# Patient Record
Sex: FEMALE | Race: WHITE | URBAN - METROPOLITAN AREA
[De-identification: names, ages, dates, MRNs, and addresses within clinical notes are randomized per-mention and may not be internally consistent; named-entity substitution may affect disease eponyms.]

---

## 2020-01-29 ENCOUNTER — HOSPITAL ENCOUNTER (OUTPATIENT)
Age: 40
Setting detail: SPECIMEN
Discharge: HOME | End: 2020-01-29
Payer: COMMERCIAL

## 2020-02-08 LAB — SURGICAL PATHOLOGY REPORT: NORMAL

## 2023-05-10 ENCOUNTER — HOSPITAL ENCOUNTER (OUTPATIENT)
Age: 43
Setting detail: SPECIMEN
Discharge: HOME | End: 2023-05-10

## 2023-05-12 LAB
MICROORGANISM SPEC CULT: ABNORMAL
MICROORGANISM SPEC CULT: ABNORMAL
SPECIMEN DESCRIPTION: ABNORMAL

## 2023-07-26 RX ORDER — PANTOPRAZOLE SODIUM 20 MG/1
TABLET, DELAYED RELEASE ORAL
COMMUNITY
Start: 2023-05-17 | End: 2023-07-27 | Stop reason: SDUPTHER

## 2023-07-26 RX ORDER — FLUTICASONE FUROATE AND VILANTEROL TRIFENATATE 200; 25 UG/1; UG/1
POWDER RESPIRATORY (INHALATION)
COMMUNITY
Start: 2023-05-17 | End: 2023-07-27 | Stop reason: SDUPTHER

## 2023-07-27 RX ORDER — PANTOPRAZOLE SODIUM 20 MG/1
TABLET, DELAYED RELEASE ORAL
Qty: 90 TABLET | Refills: 1 | Status: SHIPPED | OUTPATIENT
Start: 2023-07-27

## 2023-07-27 RX ORDER — CETIRIZINE HYDROCHLORIDE 10 MG/1
10 TABLET ORAL DAILY
Qty: 90 TABLET | Refills: 1 | Status: SHIPPED | OUTPATIENT
Start: 2023-07-27

## 2023-07-27 RX ORDER — ALBUTEROL SULFATE 90 UG/1
2 AEROSOL, METERED RESPIRATORY (INHALATION) EVERY 6 HOURS PRN
Qty: 18 G | Refills: 3 | Status: SHIPPED | OUTPATIENT
Start: 2023-07-27

## 2023-07-27 RX ORDER — FLUTICASONE FUROATE AND VILANTEROL TRIFENATATE 200; 25 UG/1; UG/1
POWDER RESPIRATORY (INHALATION)
Qty: 1 EACH | Refills: 3 | Status: SHIPPED | OUTPATIENT
Start: 2023-07-27

## 2023-07-27 RX ORDER — FLUTICASONE PROPIONATE 50 MCG
2 SPRAY, SUSPENSION (ML) NASAL DAILY
Qty: 48 G | Refills: 1 | Status: SHIPPED | OUTPATIENT
Start: 2023-07-27

## 2023-08-07 ENCOUNTER — HOSPITAL ENCOUNTER (OUTPATIENT)
Age: 43
Discharge: HOME | End: 2023-08-07
Payer: COMMERCIAL

## 2023-08-07 ENCOUNTER — OFFICE VISIT (OUTPATIENT)
Age: 43
End: 2023-08-07
Payer: COMMERCIAL

## 2023-08-07 ENCOUNTER — HOSPITAL ENCOUNTER (OUTPATIENT)
Dept: CT IMAGING | Age: 43
Discharge: HOME | End: 2023-08-09
Payer: COMMERCIAL

## 2023-08-07 VITALS
WEIGHT: 148.5 LBS | HEIGHT: 60 IN | DIASTOLIC BLOOD PRESSURE: 80 MMHG | TEMPERATURE: 98.5 F | RESPIRATION RATE: 18 BRPM | SYSTOLIC BLOOD PRESSURE: 128 MMHG | HEART RATE: 66 BPM | BODY MASS INDEX: 29.16 KG/M2

## 2023-08-07 DIAGNOSIS — R10.12 LEFT UPPER QUADRANT ABDOMINAL PAIN: ICD-10-CM

## 2023-08-07 DIAGNOSIS — R11.0 NAUSEA: ICD-10-CM

## 2023-08-07 DIAGNOSIS — R10.12 LEFT UPPER QUADRANT ABDOMINAL PAIN: Primary | ICD-10-CM

## 2023-08-07 LAB
ALBUMIN SERPL-MCNC: 4.3 G/DL (ref 3.5–5.2)
ALBUMIN/GLOB SERPL: 1.2 {RATIO} (ref 1–2.5)
ALP SERPL-CCNC: 54 U/L (ref 35–104)
ALT SERPL-CCNC: 14 U/L (ref 5–33)
ANION GAP SERPL CALCULATED.3IONS-SCNC: 13 MMOL/L (ref 9–17)
AST SERPL-CCNC: 20 U/L
BASOPHILS # BLD: 0.06 K/UL (ref 0–0.2)
BASOPHILS NFR BLD: 1 % (ref 0–2)
BILIRUB SERPL-MCNC: 0.7 MG/DL (ref 0.3–1.2)
BILIRUBIN, POC: NEGATIVE
BLOOD URINE, POC: NEGATIVE
BUN SERPL-MCNC: 12 MG/DL (ref 6–20)
CALCIUM SERPL-MCNC: 9.4 MG/DL (ref 8.6–10.4)
CHLORIDE SERPL-SCNC: 99 MMOL/L (ref 98–107)
CLARITY, POC: CLEAR
CO2 SERPL-SCNC: 25 MMOL/L (ref 20–31)
COLOR, POC: YELLOW
CONTROL: PRESENT
CREAT SERPL-MCNC: 0.7 MG/DL (ref 0.5–0.9)
EOSINOPHIL # BLD: 0.48 K/UL (ref 0–0.44)
EOSINOPHILS RELATIVE PERCENT: 5 % (ref 1–4)
ERYTHROCYTE [DISTWIDTH] IN BLOOD BY AUTOMATED COUNT: 12.1 % (ref 11.8–14.4)
GFR SERPL CREATININE-BSD FRML MDRD: >60 ML/MIN/1.73M2
GLUCOSE SERPL-MCNC: 119 MG/DL (ref 70–99)
GLUCOSE URINE, POC: NEGATIVE
HCT VFR BLD AUTO: 42.2 % (ref 36.3–47.1)
HGB BLD-MCNC: 14.6 G/DL (ref 11.9–15.1)
IMM GRANULOCYTES # BLD AUTO: <0.03 K/UL (ref 0–0.3)
IMM GRANULOCYTES NFR BLD: 0 %
KETONES, POC: NEGATIVE
LEUKOCYTE EST, POC: NEGATIVE
LYMPHOCYTES NFR BLD: 2.8 K/UL (ref 1.1–3.7)
LYMPHOCYTES RELATIVE PERCENT: 29 % (ref 24–43)
MCH RBC QN AUTO: 33 PG (ref 25.2–33.5)
MCHC RBC AUTO-ENTMCNC: 34.6 G/DL (ref 28.4–34.8)
MCV RBC AUTO: 95.3 FL (ref 82.6–102.9)
MONOCYTES NFR BLD: 0.65 K/UL (ref 0.1–1.2)
MONOCYTES NFR BLD: 7 % (ref 3–12)
NEUTROPHILS NFR BLD: 58 % (ref 36–65)
NEUTS SEG NFR BLD: 5.69 K/UL (ref 1.5–8.1)
NITRITE, POC: NEGATIVE
NRBC BLD-RTO: 0 PER 100 WBC
PH, POC: 5.5
PLATELET # BLD AUTO: 268 K/UL (ref 138–453)
PMV BLD AUTO: 9.5 FL (ref 8.1–13.5)
POTASSIUM SERPL-SCNC: 3.6 MMOL/L (ref 3.7–5.3)
PREGNANCY TEST URINE, POC: NEGATIVE
PROT SERPL-MCNC: 7.9 G/DL (ref 6.4–8.3)
PROTEIN, POC: NEGATIVE
RBC # BLD AUTO: 4.43 M/UL (ref 3.95–5.11)
SODIUM SERPL-SCNC: 137 MMOL/L (ref 135–144)
SPECIFIC GRAVITY, POC: 1.02
UROBILINOGEN, POC: 0.2
WBC OTHER # BLD: 9.7 K/UL (ref 3.5–11.3)

## 2023-08-07 PROCEDURE — 81025 URINE PREGNANCY TEST: CPT

## 2023-08-07 PROCEDURE — 2580000003 HC RX 258

## 2023-08-07 PROCEDURE — 85025 COMPLETE CBC W/AUTO DIFF WBC: CPT

## 2023-08-07 PROCEDURE — 81025 URINE PREGNANCY TEST: CPT | Performed by: FAMILY MEDICINE

## 2023-08-07 PROCEDURE — 80053 COMPREHEN METABOLIC PANEL: CPT

## 2023-08-07 PROCEDURE — 36415 COLL VENOUS BLD VENIPUNCTURE: CPT

## 2023-08-07 PROCEDURE — 99214 OFFICE O/P EST MOD 30 MIN: CPT | Performed by: FAMILY MEDICINE

## 2023-08-07 PROCEDURE — 81003 URINALYSIS AUTO W/O SCOPE: CPT | Performed by: FAMILY MEDICINE

## 2023-08-07 PROCEDURE — 74160 CT ABDOMEN W/CONTRAST: CPT

## 2023-08-07 PROCEDURE — 81002 URINALYSIS NONAUTO W/O SCOPE: CPT

## 2023-08-07 PROCEDURE — 6360000004 HC RX CONTRAST MEDICATION

## 2023-08-07 RX ORDER — 0.9 % SODIUM CHLORIDE 0.9 %
80 INTRAVENOUS SOLUTION INTRAVENOUS ONCE
Status: COMPLETED | OUTPATIENT
Start: 2023-08-07 | End: 2023-08-07

## 2023-08-07 RX ORDER — SODIUM CHLORIDE 0.9 % (FLUSH) 0.9 %
10 SYRINGE (ML) INJECTION PRN
Status: DISCONTINUED | OUTPATIENT
Start: 2023-08-07 | End: 2023-08-10 | Stop reason: HOSPADM

## 2023-08-07 RX ORDER — HYDROCHLOROTHIAZIDE 25 MG/1
TABLET ORAL
COMMUNITY
Start: 2023-05-17

## 2023-08-07 RX ORDER — CYCLOBENZAPRINE HCL 10 MG
TABLET ORAL
COMMUNITY
Start: 2023-05-17

## 2023-08-07 RX ADMIN — IOPAMIDOL 75 ML: 755 INJECTION, SOLUTION INTRAVENOUS at 18:00

## 2023-08-07 RX ADMIN — SODIUM CHLORIDE, PRESERVATIVE FREE 10 ML: 5 INJECTION INTRAVENOUS at 18:01

## 2023-08-07 RX ADMIN — SODIUM CHLORIDE 80 ML: 9 INJECTION, SOLUTION INTRAVENOUS at 18:01

## 2023-08-07 SDOH — ECONOMIC STABILITY: INCOME INSECURITY: HOW HARD IS IT FOR YOU TO PAY FOR THE VERY BASICS LIKE FOOD, HOUSING, MEDICAL CARE, AND HEATING?: VERY HARD

## 2023-08-07 SDOH — ECONOMIC STABILITY: HOUSING INSECURITY
IN THE LAST 12 MONTHS, WAS THERE A TIME WHEN YOU DID NOT HAVE A STEADY PLACE TO SLEEP OR SLEPT IN A SHELTER (INCLUDING NOW)?: NO

## 2023-08-07 SDOH — ECONOMIC STABILITY: FOOD INSECURITY: WITHIN THE PAST 12 MONTHS, YOU WORRIED THAT YOUR FOOD WOULD RUN OUT BEFORE YOU GOT MONEY TO BUY MORE.: NEVER TRUE

## 2023-08-07 SDOH — ECONOMIC STABILITY: FOOD INSECURITY: WITHIN THE PAST 12 MONTHS, THE FOOD YOU BOUGHT JUST DIDN'T LAST AND YOU DIDN'T HAVE MONEY TO GET MORE.: NEVER TRUE

## 2023-08-07 ASSESSMENT — PATIENT HEALTH QUESTIONNAIRE - PHQ9
SUM OF ALL RESPONSES TO PHQ QUESTIONS 1-9: 10
SUM OF ALL RESPONSES TO PHQ QUESTIONS 1-9: 10
10. IF YOU CHECKED OFF ANY PROBLEMS, HOW DIFFICULT HAVE THESE PROBLEMS MADE IT FOR YOU TO DO YOUR WORK, TAKE CARE OF THINGS AT HOME, OR GET ALONG WITH OTHER PEOPLE: 2
SUM OF ALL RESPONSES TO PHQ9 QUESTIONS 1 & 2: 4
1. LITTLE INTEREST OR PLEASURE IN DOING THINGS: 1
5. POOR APPETITE OR OVEREATING: 1
4. FEELING TIRED OR HAVING LITTLE ENERGY: 1
9. THOUGHTS THAT YOU WOULD BE BETTER OFF DEAD, OR OF HURTING YOURSELF: 0
8. MOVING OR SPEAKING SO SLOWLY THAT OTHER PEOPLE COULD HAVE NOTICED. OR THE OPPOSITE, BEING SO FIGETY OR RESTLESS THAT YOU HAVE BEEN MOVING AROUND A LOT MORE THAN USUAL: 1
6. FEELING BAD ABOUT YOURSELF - OR THAT YOU ARE A FAILURE OR HAVE LET YOURSELF OR YOUR FAMILY DOWN: 1
SUM OF ALL RESPONSES TO PHQ QUESTIONS 1-9: 10
7. TROUBLE CONCENTRATING ON THINGS, SUCH AS READING THE NEWSPAPER OR WATCHING TELEVISION: 1
3. TROUBLE FALLING OR STAYING ASLEEP: 1
2. FEELING DOWN, DEPRESSED OR HOPELESS: 3
SUM OF ALL RESPONSES TO PHQ QUESTIONS 1-9: 10

## 2023-08-07 ASSESSMENT — ENCOUNTER SYMPTOMS
SHORTNESS OF BREATH: 0
CONSTIPATION: 0
COLOR CHANGE: 0
CHEST TIGHTNESS: 0
VOMITING: 0
NAUSEA: 1
DIARRHEA: 0

## 2023-08-07 ASSESSMENT — LIFESTYLE VARIABLES
HOW OFTEN DO YOU HAVE A DRINK CONTAINING ALCOHOL: 2-4 TIMES A MONTH
HOW MANY STANDARD DRINKS CONTAINING ALCOHOL DO YOU HAVE ON A TYPICAL DAY: 1 OR 2

## 2023-08-07 NOTE — PATIENT INSTRUCTIONS
Thank you for following up with us at [facility] outpatient residency clinic. It was a pleasure to see you today. Our plan is the following:  -An order for a CT of the abdomen has been placed. I will call with the results of the imaging.   -An order for labwork has also been placed. Thank you for following up with us at [facility] outpatient residency clinic. It was a pleasure to see you today. If you have any additional questions or concerns, please call the office ([phone]) and speak to one of the staff. They will triage and forward the message to the doctors. Have a great rest of your day.

## 2023-08-07 NOTE — PROGRESS NOTES
[address] [facility]  [address] [facility], [address] [place] [address]  Phone: [phone]  Fax: [phone]      Date of Visit:  2023  Patient Name: Haylie Murray   Patient :  1980     HPI:     Haylie Murray is a 38 y.o. female who presents today to discuss left upper quadrant pain. Chief Complaint   Patient presents with    Flank Pain     Right side rib pain for the past few weeks. Hurts to breathe deeply or lay on it. NO acute injury. No fever. OTC tylenol and Gas-X     Patient reports right-sided rib pain that she has been experiencing over the past few weeks. The discomfort is constant. She feels that it has been getting worse over past few days. She reports some nausea and dizziness. No vomiting. No diahrea or constipation. No blood in urine or stool. Reports history of uterine fiboids and possible endometriosis. Sexually active with no contraceptive. LMP 23. Pain started prior to when she had her last period. I personally reviewed the patient's past medical history, current medications, allergies, surgical history, family history and social history. Updates were made as necessary. REVIEW OF SYSTEM      Review of Systems   Constitutional:  Negative for appetite change, chills and fever. Respiratory:  Negative for chest tightness and shortness of breath. Pain with deep inspiration   Cardiovascular:  Negative for chest pain. Gastrointestinal:  Positive for nausea. Negative for constipation, diarrhea and vomiting. Genitourinary:  Negative for flank pain and hematuria. Skin:  Negative for color change and rash. Neurological:  Negative for dizziness and light-headedness. REVIEWED INFORMATION      No Known Allergies    There is no problem list on file for this patient. Past Medical History:   Diagnosis Date    Asthma     GERD (gastroesophageal reflux disease)        No past surgical history on file.

## 2023-08-08 ENCOUNTER — TELEPHONE (OUTPATIENT)
Age: 43
End: 2023-08-08

## 2023-08-08 RX ORDER — MELOXICAM 7.5 MG/1
7.5 TABLET ORAL DAILY
Qty: 5 TABLET | Refills: 0 | Status: SHIPPED | OUTPATIENT
Start: 2023-08-08 | End: 2023-08-13

## 2023-08-08 NOTE — TELEPHONE ENCOUNTER
Called and discussed results with patient. CT abdomen negative for acute pathology. CBC and CMP within normal limits. Discussed that the most likely etiology is a rectus abdominis strain. Recommended rest from exacerbating motions, applying ice to the affected area for up to 20 minutes at a time. Prescription for meloxicam 7.5 mg once daily for 5 days was sent to patient's pharmacy. Recommended that patient take the medication with food. A prescription for Voltaren gel was also sent to the patient's pharmacy that she may apply to the affected area up to 4 times a day. Discussed that if the patient develops fevers, chills, difficulty breathing, chest pain or worsening abdominal pain that she should be seen either at the clinic or the ER. Patient voiced understanding and is in agreement with current plan.

## 2023-08-08 NOTE — TELEPHONE ENCOUNTER
Patient calling for results of CT abd done yesterday. States she is still in pain and asking what steps are next.

## 2023-09-06 RX ORDER — HYDROCHLOROTHIAZIDE 25 MG/1
TABLET ORAL
Qty: 30 TABLET | Refills: 0 | Status: SHIPPED | OUTPATIENT
Start: 2023-09-06

## 2023-09-08 ENCOUNTER — OFFICE VISIT (OUTPATIENT)
Age: 43
End: 2023-09-08
Payer: COMMERCIAL

## 2023-09-08 ENCOUNTER — HOSPITAL ENCOUNTER (OUTPATIENT)
Age: 43
Setting detail: SPECIMEN
Discharge: HOME | End: 2023-09-08

## 2023-09-08 VITALS
TEMPERATURE: 98.2 F | DIASTOLIC BLOOD PRESSURE: 90 MMHG | SYSTOLIC BLOOD PRESSURE: 143 MMHG | WEIGHT: 150 LBS | BODY MASS INDEX: 29.45 KG/M2 | HEIGHT: 60 IN | HEART RATE: 62 BPM

## 2023-09-08 DIAGNOSIS — R07.81 RIB PAIN ON LEFT SIDE: ICD-10-CM

## 2023-09-08 DIAGNOSIS — M79.89 PAIN AND SWELLING OF LOWER LEG, UNSPECIFIED LATERALITY: Primary | ICD-10-CM

## 2023-09-08 DIAGNOSIS — M79.669 PAIN AND SWELLING OF LOWER LEG, UNSPECIFIED LATERALITY: ICD-10-CM

## 2023-09-08 DIAGNOSIS — I73.00 RAYNAUD'S PHENOMENON WITHOUT GANGRENE: ICD-10-CM

## 2023-09-08 DIAGNOSIS — R03.0 ELEVATED BLOOD PRESSURE READING: ICD-10-CM

## 2023-09-08 DIAGNOSIS — M79.89 PAIN AND SWELLING OF LOWER LEG, UNSPECIFIED LATERALITY: ICD-10-CM

## 2023-09-08 DIAGNOSIS — M79.669 PAIN AND SWELLING OF LOWER LEG, UNSPECIFIED LATERALITY: Primary | ICD-10-CM

## 2023-09-08 PROBLEM — I10 HYPERTENSION: Status: ACTIVE | Noted: 2023-09-08

## 2023-09-08 LAB
ANION GAP SERPL CALCULATED.3IONS-SCNC: 12 MMOL/L (ref 9–17)
BNP SERPL-MCNC: 201 PG/ML
BUN SERPL-MCNC: 8 MG/DL (ref 6–20)
CALCIUM SERPL-MCNC: 9.7 MG/DL (ref 8.6–10.4)
CHLORIDE SERPL-SCNC: 100 MMOL/L (ref 98–107)
CO2 SERPL-SCNC: 27 MMOL/L (ref 20–31)
CREAT SERPL-MCNC: 0.6 MG/DL (ref 0.5–0.9)
GFR SERPL CREATININE-BSD FRML MDRD: >60 ML/MIN/1.73M2
GLUCOSE SERPL-MCNC: 85 MG/DL (ref 70–99)
POTASSIUM SERPL-SCNC: 3.6 MMOL/L (ref 3.7–5.3)
SODIUM SERPL-SCNC: 139 MMOL/L (ref 135–144)
TSH SERPL DL<=0.05 MIU/L-ACNC: 2.97 UIU/ML (ref 0.3–5)

## 2023-09-08 PROCEDURE — 99214 OFFICE O/P EST MOD 30 MIN: CPT

## 2023-09-08 PROCEDURE — 3074F SYST BP LT 130 MM HG: CPT

## 2023-09-08 PROCEDURE — 3078F DIAST BP <80 MM HG: CPT

## 2023-09-08 NOTE — PROGRESS NOTES
[address] [facility]  [address] [facility], [address]   Phone: [phone]  Fax: [phone]      Date of Visit:  2023  Patient Name: Jennifer Ayala   Patient :  1980     Chief Complaint   Patient presents with    Other     Left sided pain, ? Pulled muscle, comes and goes, never completely went away- same as when last seen  Both feet and lower legs swollen and feel tight and painful x 1.5 weeks, whole body feels swollen       HPI:     Jennifer Ayala is a 37 y.o. female who presents today to discuss left side rib pain and bilateral lower extremity pain and swelling. She describes her rib pain as intermittent, not associated with movement, deep breath hurts, took meloxicam but not voltaren gel. She states that she woke up with it a month ago and has been seen here in the office since but has not had improvement. Leg swelling, started almost two weeks ago, started prior to missing HCTZ for a week, left gets worse than the right. Calf swells, no new supplements, no history of blood clots, skin feels tight. She feels like her legs are ballooning along with her thighs. She states that her mother has varicose veins. Father has \"lymph node\" cancer, would need further clarification on what type this is. and uncle has skin cancer. She works as a dispatcher. She does not that she has missed period for august, never missed a period before, experiences hot flashes, severe pain week before period due to fibroids. Unsure of what age female relatives have gone through menopause. She additionally notes that her hands feel hot and appear to have more prominent veins than her usual.     Discussed with patient her thyroid history. She states that she was diagnosed at one point with hypothyroidism and was started on a low dose synthroid. She was tested a few years ago and was found to have normal thyroid levels.  She has since stopped taking

## 2023-09-08 NOTE — PATIENT INSTRUCTIONS
Follow up in 1 week for swelling in legs  Please get lab work done, BNP, BMP and TSH with Reflex T4  Please watch out for if your legs become hot, red, swollen, painful please call to let us know  Thank you for your visit today!

## 2023-09-12 ASSESSMENT — ENCOUNTER SYMPTOMS
NAUSEA: 0
ABDOMINAL DISTENTION: 0
COUGH: 0
DIARRHEA: 0
COLOR CHANGE: 1
CONSTIPATION: 0
VOMITING: 0
SHORTNESS OF BREATH: 0

## 2023-09-15 ENCOUNTER — OFFICE VISIT (OUTPATIENT)
Age: 43
End: 2023-09-15
Payer: COMMERCIAL

## 2023-09-15 VITALS
SYSTOLIC BLOOD PRESSURE: 121 MMHG | HEART RATE: 76 BPM | WEIGHT: 153 LBS | BODY MASS INDEX: 29.88 KG/M2 | DIASTOLIC BLOOD PRESSURE: 72 MMHG | TEMPERATURE: 97.6 F | RESPIRATION RATE: 16 BRPM

## 2023-09-15 DIAGNOSIS — M79.669 PAIN AND SWELLING OF LOWER LEG, UNSPECIFIED LATERALITY: ICD-10-CM

## 2023-09-15 DIAGNOSIS — E87.6 HYPOKALEMIA: ICD-10-CM

## 2023-09-15 DIAGNOSIS — I73.00 RAYNAUD'S PHENOMENON WITHOUT GANGRENE: ICD-10-CM

## 2023-09-15 DIAGNOSIS — I10 PRIMARY HYPERTENSION: Primary | ICD-10-CM

## 2023-09-15 DIAGNOSIS — M79.89 PAIN AND SWELLING OF LOWER LEG, UNSPECIFIED LATERALITY: ICD-10-CM

## 2023-09-15 PROCEDURE — 3074F SYST BP LT 130 MM HG: CPT | Performed by: FAMILY MEDICINE

## 2023-09-15 PROCEDURE — 99213 OFFICE O/P EST LOW 20 MIN: CPT | Performed by: FAMILY MEDICINE

## 2023-09-15 PROCEDURE — 3078F DIAST BP <80 MM HG: CPT | Performed by: FAMILY MEDICINE

## 2023-09-15 PROCEDURE — 99211 OFF/OP EST MAY X REQ PHY/QHP: CPT

## 2023-09-15 RX ORDER — POTASSIUM CHLORIDE 750 MG/1
10 TABLET, EXTENDED RELEASE ORAL DAILY
Qty: 7 TABLET | Refills: 0 | Status: SHIPPED | OUTPATIENT
Start: 2023-09-15 | End: 2023-09-22

## 2023-09-15 RX ORDER — NIFEDIPINE 30 MG/1
30 TABLET, EXTENDED RELEASE ORAL DAILY
Qty: 30 TABLET | Refills: 0 | Status: SHIPPED | OUTPATIENT
Start: 2023-09-15 | End: 2023-10-15

## 2023-09-15 NOTE — PROGRESS NOTES
[address] [facility] Res[facility]  [address] [facility], [address] [facility]  Phone: [phone]  Fax: [phone]      Date of Visit:  9/15/2023  Patient Name: Douglas Fuller   Patient :  1980     Chief Complaint   Patient presents with    Follow-up     F/u swelling, labs, feet not as bad but still tingling and burning in am, refill HCTZ 90 day with refills       HPI:     Douglas Fuller is a 37 y.o. female who presents today to discuss pain in legs and swelling. She was last seen here on 23. Review labs which does show hypokalemia. Discussed starting a supplement. She states that she was placed on HCTZ in the past for hypertension and her blood pressure has been normal, which it is within normal limits today. No blood pressure monitor at home. Discussed getting one so we can better monitor and adjust medications if needed. Has checked blood pressure at pharmacy which always shows normal.   She states that the pain in her legs have come down some and her legs do not feel as swollen and tight. Discussed that hopefully with adjusting blood pressure medication we can prevent further potassium loss. Discussed the need to closely monitor blood pressure during the transition so we can find the optimal dosing. She is in agreement with that. Otherwise no acute concerns. I personally reviewed the patient's past medical history, current medications, allergies, surgical history, family history and social history. Updates were made as necessary. REVIEW OF SYSTEM      Review of Systems   Constitutional:  Negative for chills, diaphoresis and fever. Respiratory:  Negative for cough and shortness of breath. Cardiovascular:  Positive for leg swelling. Negative for chest pain and palpitations. Gastrointestinal:  Negative for abdominal pain, constipation, diarrhea, nausea and vomiting.        REVIEWED INFORMATION      No Known Allergies    Patient Active Problem List

## 2023-09-19 ASSESSMENT — ENCOUNTER SYMPTOMS
COUGH: 0
SHORTNESS OF BREATH: 0
NAUSEA: 0
DIARRHEA: 0
VOMITING: 0
ABDOMINAL PAIN: 0
CONSTIPATION: 0

## 2023-09-28 ENCOUNTER — TELEPHONE (OUTPATIENT)
Age: 43
End: 2023-09-28

## 2023-09-28 DIAGNOSIS — I73.00 RAYNAUD'S PHENOMENON WITHOUT GANGRENE: Primary | ICD-10-CM

## 2023-09-28 DIAGNOSIS — I10 PRIMARY HYPERTENSION: ICD-10-CM

## 2023-09-28 RX ORDER — AMLODIPINE BESYLATE 5 MG/1
5 TABLET ORAL DAILY
Qty: 30 TABLET | Refills: 0 | Status: SHIPPED | OUTPATIENT
Start: 2023-09-28

## 2023-09-28 NOTE — TELEPHONE ENCOUNTER
I would recommend stopping the Nifedipine, I will send in Amlodipine 5mg daily and would recommend checking blood pressure frequently and keeping a log to bring into next visit.

## 2023-09-28 NOTE — TELEPHONE ENCOUNTER
Patient called and stated that the Nifedipine is making her sick. She stated that she has been having horrible headaches and she has been vomiting every time she takes it.

## 2023-10-04 ENCOUNTER — TELEPHONE (OUTPATIENT)
Age: 43
End: 2023-10-04

## 2023-10-04 DIAGNOSIS — I10 PRIMARY HYPERTENSION: Primary | ICD-10-CM

## 2023-10-04 NOTE — TELEPHONE ENCOUNTER
Patient called and would like for you to give her a call regarding her BP medications. She stated that the current medication is making her sick also and she would like to discuss some things regarding switching the medication. She also stated that she has been diagnosed with neuropathy but it is not being treated.

## 2023-10-05 NOTE — TELEPHONE ENCOUNTER
Patient was calling again to see if you have viewed this messgae Update Treatment Plan   Update Consent for Treatment

## 2023-10-06 RX ORDER — HYDROCHLOROTHIAZIDE 25 MG/1
25 TABLET ORAL EVERY MORNING
Qty: 30 TABLET | Refills: 0 | Status: SHIPPED | OUTPATIENT
Start: 2023-10-06

## 2023-10-06 NOTE — TELEPHONE ENCOUNTER
Patient called again. Upset she has not gotten any calls back regarding her BP med. Asking if you will put her back on the HCTZ.     Pharm:  [facility]

## 2023-10-06 NOTE — TELEPHONE ENCOUNTER
Gave patient a call back and talked about her blood pressure medications states that amlodipine and nifedipine are causing her to throw up, give her headaches, and makes her feel dizzy. This has since gotten better off of the medication. Would prefer to go back on HCTZ at this time. Told patient I would double check Dr. Luma Rudd and if not otherwise contraindicated I would fill that medication for her. Will re discuss BP medications with Dr. Devika Mason at her appointment on Wednesday.

## 2023-10-11 ENCOUNTER — OFFICE VISIT (OUTPATIENT)
Age: 43
End: 2023-10-11
Payer: COMMERCIAL

## 2023-10-11 ENCOUNTER — HOSPITAL ENCOUNTER (OUTPATIENT)
Age: 43
Setting detail: SPECIMEN
Discharge: HOME | End: 2023-10-11

## 2023-10-11 VITALS
RESPIRATION RATE: 14 BRPM | TEMPERATURE: 98 F | DIASTOLIC BLOOD PRESSURE: 74 MMHG | SYSTOLIC BLOOD PRESSURE: 116 MMHG | HEART RATE: 80 BPM | HEIGHT: 60 IN | BODY MASS INDEX: 29.45 KG/M2 | WEIGHT: 150 LBS

## 2023-10-11 DIAGNOSIS — F41.9 ANXIETY: ICD-10-CM

## 2023-10-11 DIAGNOSIS — R07.9 CHEST PAIN, UNSPECIFIED TYPE: ICD-10-CM

## 2023-10-11 DIAGNOSIS — R53.83 OTHER FATIGUE: ICD-10-CM

## 2023-10-11 DIAGNOSIS — I10 PRIMARY HYPERTENSION: Primary | ICD-10-CM

## 2023-10-11 DIAGNOSIS — Z78.9 NONSMOKER: ICD-10-CM

## 2023-10-11 LAB
FERRITIN SERPL-MCNC: 57 NG/ML (ref 13–150)
IRON SATN MFR SERPL: 35 % (ref 20–55)
IRON SERPL-MCNC: 110 UG/DL (ref 37–145)
TIBC SERPL-MCNC: 316 UG/DL (ref 250–450)
UNSATURATED IRON BINDING CAPACITY: 206 UG/DL (ref 112–347)

## 2023-10-11 PROCEDURE — 93005 ELECTROCARDIOGRAM TRACING: CPT

## 2023-10-11 PROCEDURE — 99211 OFF/OP EST MAY X REQ PHY/QHP: CPT

## 2023-10-11 RX ORDER — FERROUS SULFATE 325(65) MG
325 TABLET ORAL EVERY OTHER DAY
Qty: 180 TABLET | Refills: 1 | Status: SHIPPED | OUTPATIENT
Start: 2023-10-11

## 2023-10-11 RX ORDER — ESCITALOPRAM OXALATE 10 MG/1
10 TABLET ORAL DAILY
Qty: 30 TABLET | Refills: 3 | Status: SHIPPED | OUTPATIENT
Start: 2023-10-11

## 2023-10-11 ASSESSMENT — ANXIETY QUESTIONNAIRES
6. BECOMING EASILY ANNOYED OR IRRITABLE: 3
3. WORRYING TOO MUCH ABOUT DIFFERENT THINGS: 3
1. FEELING NERVOUS, ANXIOUS, OR ON EDGE: 3
GAD7 TOTAL SCORE: 17
7. FEELING AFRAID AS IF SOMETHING AWFUL MIGHT HAPPEN: 0
2. NOT BEING ABLE TO STOP OR CONTROL WORRYING: 3
5. BEING SO RESTLESS THAT IT IS HARD TO SIT STILL: 2
4. TROUBLE RELAXING: 3

## 2023-10-11 ASSESSMENT — ENCOUNTER SYMPTOMS
DIARRHEA: 0
NAUSEA: 0
COUGH: 0
CHEST TIGHTNESS: 1
CONSTIPATION: 0
TROUBLE SWALLOWING: 0
ABDOMINAL PAIN: 0
PHOTOPHOBIA: 1
VOMITING: 0
SHORTNESS OF BREATH: 1
SORE THROAT: 0

## 2023-10-11 NOTE — PROGRESS NOTES
[address] [facility]  [address] [facility], [address]  Phone: [phone]  Fax: [phone]      Date of Visit:  10/11/2023  Patient Name: Kandee Landau   Patient :  1980     Chief Complaint   Patient presents with    Hypertension     Discuss meds amlodipine and nifedipine causing N/V dizzy  was switched back to HCTZ    Chest Pain     For a couple weeks left side denies numbness tingling down arms    Fatigue     Couple weeks now       HPI:     Kandee Landau is a 37 y.o. female who presents today to discuss hypertension medication. Patient has been having trouble tolerating the amlodipine and the nifedipine. She has been having nausea and vomiting when taking the medication. She would like to go back on the HCTZ. She was given a month supply. She is having good blood pressure control on this and will continue she does need refill on this medication and this will be sent in when she needs it. She is complaining of chest pain over the last couple weeks. She denies radiation to left arm, jaw or back. She states it feels like a sharp pain. Something similar couple years ago which led to initiation of HCTZ. Will obtain EKG today. Has not started any other medications recently. She is also complaining of fatigue. She states that the fatigue has been present over the last couple. Seeing black spots. Has been seeing black spots in the past. Optometrist was seen recently and tells patient she has astigmatism. Trouble sleeping, snore while she sleeps, woke herself up snoring, no CPAP, restless leg, live by herself which is new, wake up 1-2x during the night, wake up 645am goes to bed around midnight. Does not feel rested when she wakes, has tried melatonin did not work, smoked weed to help sleep once in a while. No changes in strains for marijuana, once in a blue moon. Smoked a couple weeks ago.      Diet: grilled nuggets from [employer],

## 2023-10-11 NOTE — PATIENT INSTRUCTIONS
Follow up in 1 week for Anxiety   EKG was done today looks normal   Will obtain iron studies today   Will start lexapro 10mg daily   Start iron supplement after lab work is drawn  Start magnesium oxide supplement at night to help with sleep  Thank you for your visit today!

## 2023-10-12 DIAGNOSIS — R07.9 CHEST PAIN, UNSPECIFIED TYPE: ICD-10-CM

## 2023-10-12 PROCEDURE — 99999 EKG 12-LEAD: CPT | Performed by: FAMILY MEDICINE

## 2023-10-24 DIAGNOSIS — I10 PRIMARY HYPERTENSION: ICD-10-CM

## 2023-10-24 RX ORDER — HYDROCHLOROTHIAZIDE 25 MG/1
25 TABLET ORAL EVERY MORNING
Qty: 30 TABLET | Refills: 0 | Status: SHIPPED | OUTPATIENT
Start: 2023-10-24

## 2023-11-27 DIAGNOSIS — I10 PRIMARY HYPERTENSION: ICD-10-CM

## 2023-11-27 RX ORDER — HYDROCHLOROTHIAZIDE 25 MG/1
25 TABLET ORAL EVERY MORNING
Qty: 90 TABLET | Refills: 0 | Status: SHIPPED | OUTPATIENT
Start: 2023-11-27

## 2023-12-01 RX ORDER — FLUTICASONE FUROATE AND VILANTEROL TRIFENATATE 200; 25 UG/1; UG/1
POWDER RESPIRATORY (INHALATION)
Qty: 60 EACH | Refills: 3 | Status: SHIPPED | OUTPATIENT
Start: 2023-12-01

## 2024-01-10 RX ORDER — FLUTICASONE PROPIONATE 50 MCG
2 SPRAY, SUSPENSION (ML) NASAL DAILY
Qty: 48 G | Refills: 1 | Status: SHIPPED | OUTPATIENT
Start: 2024-01-10

## 2024-01-29 RX ORDER — CETIRIZINE HYDROCHLORIDE 10 MG/1
10 TABLET ORAL DAILY
Qty: 90 TABLET | Refills: 0 | Status: SHIPPED | OUTPATIENT
Start: 2024-01-29

## 2024-01-29 RX ORDER — PANTOPRAZOLE SODIUM 20 MG/1
TABLET, DELAYED RELEASE ORAL
Qty: 90 TABLET | Refills: 0 | Status: SHIPPED | OUTPATIENT
Start: 2024-01-29

## 2024-02-21 DIAGNOSIS — F41.9 ANXIETY: ICD-10-CM

## 2024-02-22 RX ORDER — ESCITALOPRAM OXALATE 10 MG/1
10 TABLET ORAL DAILY
Qty: 90 TABLET | Refills: 0 | Status: SHIPPED | OUTPATIENT
Start: 2024-02-22

## 2024-03-12 DIAGNOSIS — E87.6 HYPOKALEMIA: ICD-10-CM

## 2024-03-13 RX ORDER — ALBUTEROL SULFATE 90 UG/1
AEROSOL, METERED RESPIRATORY (INHALATION)
Qty: 18 EACH | Refills: 3 | Status: SHIPPED | OUTPATIENT
Start: 2024-03-13

## 2024-03-13 RX ORDER — POTASSIUM CHLORIDE 750 MG/1
TABLET, EXTENDED RELEASE ORAL
Qty: 7 TABLET | Refills: 0 | OUTPATIENT
Start: 2024-03-13

## 2024-05-06 RX ORDER — PANTOPRAZOLE SODIUM 20 MG/1
TABLET, DELAYED RELEASE ORAL
Qty: 90 TABLET | Refills: 0 | Status: SHIPPED | OUTPATIENT
Start: 2024-05-06

## 2024-05-08 RX ORDER — CETIRIZINE HYDROCHLORIDE 10 MG/1
10 TABLET ORAL DAILY
Qty: 90 TABLET | Refills: 0 | Status: SHIPPED | OUTPATIENT
Start: 2024-05-08

## 2024-06-12 DIAGNOSIS — I10 PRIMARY HYPERTENSION: ICD-10-CM

## 2024-06-13 RX ORDER — HYDROCHLOROTHIAZIDE 25 MG/1
25 TABLET ORAL EVERY MORNING
Qty: 90 TABLET | Refills: 0 | OUTPATIENT
Start: 2024-06-13

## 2024-06-29 DIAGNOSIS — I10 PRIMARY HYPERTENSION: ICD-10-CM

## 2024-06-29 DIAGNOSIS — E87.6 HYPOKALEMIA: ICD-10-CM

## 2024-07-01 RX ORDER — MELOXICAM 7.5 MG/1
7.5 TABLET ORAL DAILY
Qty: 5 TABLET | Refills: 0 | OUTPATIENT
Start: 2024-07-01 | End: 2024-07-06

## 2024-07-01 RX ORDER — POTASSIUM CHLORIDE 750 MG/1
TABLET, EXTENDED RELEASE ORAL
Qty: 7 TABLET | Refills: 0 | OUTPATIENT
Start: 2024-07-01

## 2024-07-01 RX ORDER — HYDROCHLOROTHIAZIDE 25 MG/1
25 TABLET ORAL EVERY MORNING
Qty: 90 TABLET | Refills: 0 | OUTPATIENT
Start: 2024-07-01

## 2024-08-19 RX ORDER — FLUTICASONE FUROATE AND VILANTEROL TRIFENATATE 200; 25 UG/1; UG/1
POWDER RESPIRATORY (INHALATION)
Qty: 60 EACH | Refills: 3 | OUTPATIENT
Start: 2024-08-19

## 2024-09-14 DIAGNOSIS — F41.9 ANXIETY: ICD-10-CM

## 2024-09-16 RX ORDER — ESCITALOPRAM OXALATE 10 MG/1
10 TABLET ORAL DAILY
Qty: 90 TABLET | Refills: 0 | OUTPATIENT
Start: 2024-09-16

## 2024-09-16 RX ORDER — PANTOPRAZOLE SODIUM 20 MG/1
TABLET, DELAYED RELEASE ORAL
Qty: 90 TABLET | Refills: 0 | OUTPATIENT
Start: 2024-09-16

## 2024-09-16 RX ORDER — FLUTICASONE PROPIONATE 50 MCG
2 SPRAY, SUSPENSION (ML) NASAL DAILY
Refills: 1 | OUTPATIENT
Start: 2024-09-16

## 2024-12-20 DIAGNOSIS — R53.83 OTHER FATIGUE: ICD-10-CM

## 2024-12-20 RX ORDER — FERROUS SULFATE 325(65) MG
325 TABLET ORAL EVERY OTHER DAY
Qty: 75 TABLET | Refills: 4 | Status: SHIPPED | OUTPATIENT
Start: 2024-12-20